# Patient Record
Sex: FEMALE | Race: OTHER | Employment: UNEMPLOYED | URBAN - METROPOLITAN AREA
[De-identification: names, ages, dates, MRNs, and addresses within clinical notes are randomized per-mention and may not be internally consistent; named-entity substitution may affect disease eponyms.]

---

## 2024-04-24 PROBLEM — E88.1 LIPODYSTROPHY: Status: ACTIVE | Noted: 2024-04-24

## 2024-04-24 RX ORDER — DIPHENOXYLATE HYDROCHLORIDE AND ATROPINE SULFATE 2.5; .025 MG/1; MG/1
1 TABLET ORAL DAILY
COMMUNITY

## 2024-04-24 RX ORDER — CETIRIZINE HYDROCHLORIDE 10 MG/1
10 TABLET ORAL DAILY
COMMUNITY

## 2024-04-24 NOTE — PRE-PROCEDURE INSTRUCTIONS
Pre-Surgery Instructions:   Medication Instructions    cetirizine (ZyrTEC) 10 mg tablet Take day of surgery. With sip of water     Cholecalciferol (VITAMIN D-3 PO) Stop taking 7 days prior to surgery.    multivitamin (THERAGRAN) TABS Stop taking 7 days prior to surgery.    Pt instructed on use of cleanser and showering instructions for DOS - both night before and DOS - pt verbalized understanding of instructions given.    Pt instructed no hair or body products on skin for DOS   Pt also instructed no razor blade use within one week prior to surgery lupillo at surgical site areas - ok to use electric razor up till 5/1 - no shaving 5/2 or 5/3.    Pt instructed that if any health status changes between now and DOS - notify surgeon office.    Medication instructions for day surgery reviewed. Please use only a sip of water to take your instructed medications. Avoid all over the counter vitamins, supplements and NSAIDS for one week prior to surgery per anesthesia guidelines. Tylenol is ok to take as needed.     You will receive a call one business day prior to surgery with an arrival time and hospital directions. If your surgery is scheduled on a Monday, the hospital will be calling you on the Friday prior to your surgery. If you have not heard from anyone by 8pm, please call the hospital supervisor through the hospital  at [phone]. ([place] [phone] or [place] [phone]).    Do not eat or drink anything after midnight the night before your surgery, including candy, mints, lifesavers, or chewing gum. Do not drink alcohol 24hrs before your surgery. Try not to smoke at least 24hrs before your surgery.       Follow the pre surgery showering instructions as listed in the “My Surgical Experience Booklet” or otherwise provided by your surgeon's office. Do not use a blade to shave the surgical area 1 week before surgery. It is okay to use a clean electric clippers up to 24 hours before surgery. Do not apply any  lotions, creams, including makeup, cologne, deodorant, or perfumes after showering on the day of your surgery. Do not use dry shampoo, hair spray, hair gel, or any type of hair products.     No contact lenses, eye make-up, or artificial eyelashes. Remove nail polish, including gel polish, and any artificial, gel, or acrylic nails if possible. Remove all jewelry including rings and body piercing jewelry.     Wear causal clothing that is easy to take on and off. Consider your type of surgery.    Keep any valuables, jewelry, piercings at home. Please bring any specially ordered equipment (sling, braces) if indicated.    Arrange for a responsible person to drive you to and from the hospital on the day of your surgery. Please confirm the visitor policy for the day of your procedure when you receive your phone call with an arrival time.     Call the surgeon's office with any new illnesses, exposures, or additional questions prior to surgery.    Please reference your “My Surgical Experience Booklet” for additional information to prepare for your upcoming surgery.

## 2024-05-03 ENCOUNTER — ANESTHESIA EVENT (OUTPATIENT)
Dept: PERIOP | Facility: HOSPITAL | Age: 49
End: 2024-05-03
Payer: SELF-PAY

## 2024-05-03 ENCOUNTER — ANESTHESIA (OUTPATIENT)
Dept: PERIOP | Facility: HOSPITAL | Age: 49
End: 2024-05-03
Payer: SELF-PAY

## 2024-05-03 ENCOUNTER — HOSPITAL ENCOUNTER (OUTPATIENT)
Facility: HOSPITAL | Age: 49
Setting detail: OUTPATIENT SURGERY
Discharge: HOME | End: 2024-05-03
Attending: SURGERY | Admitting: SURGERY
Payer: SELF-PAY

## 2024-05-03 VITALS
BODY MASS INDEX: 24.54 KG/M2 | RESPIRATION RATE: 18 BRPM | TEMPERATURE: 97.9 F | DIASTOLIC BLOOD PRESSURE: 74 MMHG | SYSTOLIC BLOOD PRESSURE: 124 MMHG | HEART RATE: 81 BPM | OXYGEN SATURATION: 94 % | HEIGHT: 60 IN | WEIGHT: 125 LBS

## 2024-05-03 LAB
EXT PREGNANCY TEST URINE: NEGATIVE
EXT. CONTROL: NORMAL

## 2024-05-03 PROCEDURE — 81025 URINE PREGNANCY TEST: CPT | Performed by: SURGERY

## 2024-05-03 RX ORDER — MAGNESIUM HYDROXIDE 1200 MG/15ML
LIQUID ORAL AS NEEDED
Status: DISCONTINUED | OUTPATIENT
Start: 2024-05-03 | End: 2024-05-03 | Stop reason: HOSPADM

## 2024-05-03 RX ORDER — ONDANSETRON 2 MG/ML
INJECTION INTRAMUSCULAR; INTRAVENOUS AS NEEDED
Status: DISCONTINUED | OUTPATIENT
Start: 2024-05-03 | End: 2024-05-03

## 2024-05-03 RX ORDER — FENTANYL CITRATE 50 UG/ML
INJECTION, SOLUTION INTRAMUSCULAR; INTRAVENOUS AS NEEDED
Status: DISCONTINUED | OUTPATIENT
Start: 2024-05-03 | End: 2024-05-03

## 2024-05-03 RX ORDER — HYDROCODONE BITARTRATE AND ACETAMINOPHEN 5; 325 MG/1; MG/1
1 TABLET ORAL EVERY 4 HOURS PRN
Status: DISCONTINUED | OUTPATIENT
Start: 2024-05-03 | End: 2024-05-03 | Stop reason: HOSPADM

## 2024-05-03 RX ORDER — CEFAZOLIN SODIUM 1 G/3ML
INJECTION, POWDER, FOR SOLUTION INTRAMUSCULAR; INTRAVENOUS AS NEEDED
Status: DISCONTINUED | OUTPATIENT
Start: 2024-05-03 | End: 2024-05-03

## 2024-05-03 RX ORDER — DEXAMETHASONE SODIUM PHOSPHATE 10 MG/ML
INJECTION, SOLUTION INTRAMUSCULAR; INTRAVENOUS AS NEEDED
Status: DISCONTINUED | OUTPATIENT
Start: 2024-05-03 | End: 2024-05-03

## 2024-05-03 RX ORDER — SODIUM CHLORIDE, SODIUM LACTATE, POTASSIUM CHLORIDE, CALCIUM CHLORIDE 600; 310; 30; 20 MG/100ML; MG/100ML; MG/100ML; MG/100ML
75 INJECTION, SOLUTION INTRAVENOUS CONTINUOUS
Status: DISCONTINUED | OUTPATIENT
Start: 2024-05-03 | End: 2024-05-03 | Stop reason: HOSPADM

## 2024-05-03 RX ORDER — HYDROCODONE BITARTRATE AND ACETAMINOPHEN 5; 325 MG/1; MG/1
2 TABLET ORAL EVERY 4 HOURS PRN
Status: DISCONTINUED | OUTPATIENT
Start: 2024-05-03 | End: 2024-05-03 | Stop reason: HOSPADM

## 2024-05-03 RX ORDER — MIDAZOLAM HYDROCHLORIDE 2 MG/2ML
INJECTION, SOLUTION INTRAMUSCULAR; INTRAVENOUS AS NEEDED
Status: DISCONTINUED | OUTPATIENT
Start: 2024-05-03 | End: 2024-05-03

## 2024-05-03 RX ORDER — MINERAL OIL
OIL (ML) MISCELLANEOUS AS NEEDED
Status: DISCONTINUED | OUTPATIENT
Start: 2024-05-03 | End: 2024-05-03 | Stop reason: HOSPADM

## 2024-05-03 RX ORDER — KETOROLAC TROMETHAMINE 30 MG/ML
30 INJECTION, SOLUTION INTRAMUSCULAR; INTRAVENOUS ONCE
Status: COMPLETED | OUTPATIENT
Start: 2024-05-03 | End: 2024-05-03

## 2024-05-03 RX ORDER — PROPOFOL 10 MG/ML
INJECTION, EMULSION INTRAVENOUS CONTINUOUS PRN
Status: DISCONTINUED | OUTPATIENT
Start: 2024-05-03 | End: 2024-05-03

## 2024-05-03 RX ORDER — ROCURONIUM BROMIDE 10 MG/ML
INJECTION, SOLUTION INTRAVENOUS AS NEEDED
Status: DISCONTINUED | OUTPATIENT
Start: 2024-05-03 | End: 2024-05-03

## 2024-05-03 RX ORDER — ACETAMINOPHEN 10 MG/ML
INJECTION, SOLUTION INTRAVENOUS AS NEEDED
Status: DISCONTINUED | OUTPATIENT
Start: 2024-05-03 | End: 2024-05-03

## 2024-05-03 RX ORDER — FENTANYL CITRATE/PF 50 MCG/ML
25 SYRINGE (ML) INJECTION
Status: DISCONTINUED | OUTPATIENT
Start: 2024-05-03 | End: 2024-05-03 | Stop reason: HOSPADM

## 2024-05-03 RX ORDER — ONDANSETRON 2 MG/ML
4 INJECTION INTRAMUSCULAR; INTRAVENOUS ONCE AS NEEDED
Status: DISCONTINUED | OUTPATIENT
Start: 2024-05-03 | End: 2024-05-03 | Stop reason: HOSPADM

## 2024-05-03 RX ORDER — HYDROMORPHONE HCL/PF 1 MG/ML
SYRINGE (ML) INJECTION AS NEEDED
Status: DISCONTINUED | OUTPATIENT
Start: 2024-05-03 | End: 2024-05-03

## 2024-05-03 RX ORDER — PROPOFOL 10 MG/ML
INJECTION, EMULSION INTRAVENOUS AS NEEDED
Status: DISCONTINUED | OUTPATIENT
Start: 2024-05-03 | End: 2024-05-03

## 2024-05-03 RX ORDER — LIDOCAINE HYDROCHLORIDE 10 MG/ML
INJECTION, SOLUTION EPIDURAL; INFILTRATION; INTRACAUDAL; PERINEURAL AS NEEDED
Status: DISCONTINUED | OUTPATIENT
Start: 2024-05-03 | End: 2024-05-03

## 2024-05-03 RX ORDER — CEFAZOLIN SODIUM 2 G/50ML
2000 SOLUTION INTRAVENOUS ONCE
Status: DISCONTINUED | OUTPATIENT
Start: 2024-05-03 | End: 2024-05-03 | Stop reason: HOSPADM

## 2024-05-03 RX ORDER — SODIUM CHLORIDE, SODIUM LACTATE, POTASSIUM CHLORIDE, CALCIUM CHLORIDE 600; 310; 30; 20 MG/100ML; MG/100ML; MG/100ML; MG/100ML
INJECTION, SOLUTION INTRAVENOUS CONTINUOUS PRN
Status: DISCONTINUED | OUTPATIENT
Start: 2024-05-03 | End: 2024-05-03

## 2024-05-03 RX ADMIN — ACETAMINOPHEN 1000 MG: 10 INJECTION INTRAVENOUS at 14:06

## 2024-05-03 RX ADMIN — SODIUM CHLORIDE, SODIUM LACTATE, POTASSIUM CHLORIDE, AND CALCIUM CHLORIDE: .6; .31; .03; .02 INJECTION, SOLUTION INTRAVENOUS at 12:25

## 2024-05-03 RX ADMIN — PROPOFOL 120 MG: 10 INJECTION, EMULSION INTRAVENOUS at 11:13

## 2024-05-03 RX ADMIN — ROCURONIUM BROMIDE 50 MG: 10 INJECTION INTRAVENOUS at 11:13

## 2024-05-03 RX ADMIN — ROCURONIUM BROMIDE 20 MG: 10 INJECTION INTRAVENOUS at 12:50

## 2024-05-03 RX ADMIN — ROCURONIUM BROMIDE 20 MG: 10 INJECTION INTRAVENOUS at 12:16

## 2024-05-03 RX ADMIN — DEXAMETHASONE SODIUM PHOSPHATE 10 MG: 10 INJECTION, SOLUTION INTRAMUSCULAR; INTRAVENOUS at 11:13

## 2024-05-03 RX ADMIN — ONDANSETRON 4 MG: 2 INJECTION INTRAMUSCULAR; INTRAVENOUS at 14:16

## 2024-05-03 RX ADMIN — MIDAZOLAM HYDROCHLORIDE 2 MG: 1 INJECTION, SOLUTION INTRAMUSCULAR; INTRAVENOUS at 11:07

## 2024-05-03 RX ADMIN — HYDROMORPHONE HYDROCHLORIDE 0.5 MG: 1 INJECTION, SOLUTION INTRAMUSCULAR; INTRAVENOUS; SUBCUTANEOUS at 12:43

## 2024-05-03 RX ADMIN — KETOROLAC TROMETHAMINE 30 MG: 30 INJECTION, SOLUTION INTRAMUSCULAR; INTRAVENOUS at 14:16

## 2024-05-03 RX ADMIN — SODIUM CHLORIDE, SODIUM LACTATE, POTASSIUM CHLORIDE, AND CALCIUM CHLORIDE: .6; .31; .03; .02 INJECTION, SOLUTION INTRAVENOUS at 13:24

## 2024-05-03 RX ADMIN — FENTANYL CITRATE 50 MCG: 50 INJECTION, SOLUTION INTRAMUSCULAR; INTRAVENOUS at 11:13

## 2024-05-03 RX ADMIN — LIDOCAINE HYDROCHLORIDE 50 MG: 10 INJECTION, SOLUTION EPIDURAL; INFILTRATION; INTRACAUDAL; PERINEURAL at 11:13

## 2024-05-03 RX ADMIN — SUGAMMADEX 120 MG: 100 INJECTION, SOLUTION INTRAVENOUS at 14:37

## 2024-05-03 RX ADMIN — SODIUM CHLORIDE, SODIUM LACTATE, POTASSIUM CHLORIDE, AND CALCIUM CHLORIDE: .6; .31; .03; .02 INJECTION, SOLUTION INTRAVENOUS at 11:05

## 2024-05-03 RX ADMIN — PROPOFOL 120 MCG/KG/MIN: 10 INJECTION, EMULSION INTRAVENOUS at 11:24

## 2024-05-03 RX ADMIN — CEFAZOLIN 1000 MG: 1 INJECTION, POWDER, FOR SOLUTION INTRAMUSCULAR; INTRAVENOUS; PARENTERAL at 11:23

## 2024-05-03 RX ADMIN — SODIUM CHLORIDE, SODIUM LACTATE, POTASSIUM CHLORIDE, AND CALCIUM CHLORIDE: .6; .31; .03; .02 INJECTION, SOLUTION INTRAVENOUS at 11:45

## 2024-05-03 RX ADMIN — FENTANYL CITRATE 50 MCG: 50 INJECTION, SOLUTION INTRAMUSCULAR; INTRAVENOUS at 11:41

## 2024-05-03 NOTE — NURSING NOTE
OOB and ambulated to bathroom, voided easily. Tolerating po well. Discharge instructions given and drain teaching completed

## 2024-05-03 NOTE — DISCHARGE INSTR - AVS FIRST PAGE
[facility]       [facility], [address] [place] [address], [address], [place] [address]   P [phone]/F [phone]/ [email or web address]        Home Instructions for the following procedures:   Abdominoplasty, Bodylift, Panniculectomy, Thigh lift       Please call the office today to schedule a post operative visit, and tell the office staff that your doctor needs to see you in our office in 7-10 days.       Use your pain medication as instructed. Pain medication may cause constipation.  Take something over the counter such as Senakot, or Colace TWICE daily while on pain medication. You want to make sure that you take the pain medication with food, to avoid getting an upset stomach. Do not drink alcohol while taking the pain medications.    Call your doctor if you notice any excessive swelling or bleeding.    Exercise calf muscles every hour. Extend your foot up and own at the ankle several times. Getting our of bed after surgery and walking is important to your health to prevent blood clots.    Elevate both the head and foot of the bed with two to three pillows. This will relieve tension on the suture line. Many patients choose to recover in a recliner for the first few days to help with putting strain on the abdominal area.    You may feel the need to walk in the bent over position. Try to stand up straight. It is not uncommon to develop low back pain when you walk in the bent-over position. This discomfort should subside once you are standing upright again.    Drink plenty of liquids. Your urine should appear clear. If your urine is yellow you need to drink more fluids    Do not smoke or use nicotine products for 2 months after your procedure. Any smoking or nicotine use may cause the skin of the abdomen to turn black and die off.    Do not lift anything that weights more then 10 pounds for about 4 weeks after the day of your surgery unless instructed by your  doctor.    You may remove dressings and shower the next day unless otherwise instructed by your doctor. IF you were given a binder/garment continue wearing your binder/garment at all times, except when showering or washing the garment. IF you are wearing a binder, sometimes patients are more comfortable wearing an old T-shirt underneath the binder. SOME patient may not have a binder, if this is the case, you will be placed into a garment when the drains are removed.    If you have drains, leaking around the drain site may occur. THIS IS NORMAL. You may apply a gauze sponge around the area to help with the drainage. The hospital staff will show you how to take care of the drains. Also, you will need to keep a record of the amount of drainage from each drain. Each drain will drain DIFFERENT amounts of fluid, THIS IS NORMAL.    It is not uncommon to have some serous fluid (yellowish, straw colored) drainage from your incisions or string-like particles in your drainage canisters.    You may have serous fluid drain from around the belly button. THIS IS NORMAL.  You may also have a small incision between your butt cheek for drainage due to liposucion. This drainage will stop within 24-48 hours. The opening will close on its own.    13. I recommend take 1-2 protein shakes per day with recovering to help with healing. If you were on any weight loss or appetite suppression medication prior to surgery, I recommend 2        protein shakes per day for 4 weeks after surgery.      Do not hesitate to call the office at [phone] if you have any questions about your surgery. The nursing staff will be glad to assist you in any possible way. If it is necessary for you to contract a doctor when the office is closed or on the weekend, please call [phone] and it will direct you to the answering service.   A physician will contact you to assist you with any problems or questions.

## 2024-05-03 NOTE — ANESTHESIA PREPROCEDURE EVALUATION
Procedure:  TUMMY TUCK (Abdomen)    Relevant Problems   Rheumatology   (+) Lipodystrophy        Physical Exam    Airway    Mallampati score: I  TM Distance: >3 FB  Neck ROM: full     Dental        Cardiovascular      Pulmonary      Other Findings  post-pubertal.      Anesthesia Plan  ASA Score- 1     Anesthesia Type- general with ASA Monitors.         Additional Monitors:     Airway Plan: ETT.           Plan Factors-Exercise tolerance (METS): >4 METS.    Chart reviewed.   Existing labs reviewed. Patient summary reviewed.                  Induction- intravenous.    Postoperative Plan- Plan for postoperative opioid use. Planned trial extubation    Informed Consent- Anesthetic plan and risks discussed with patient.  I personally reviewed this patient with the CRNA. Discussed and agreed on the Anesthesia Plan with the CRNA..

## 2024-05-03 NOTE — DISCHARGE SUMMARY
PLASTIC, RECONSTRUCTIVE, [facility] HAND SURGERY   Discharge Summary  Date of Admission:   5/3/2024  Date of Discharge:   05/03/24  Attending:  Julian Rose MD  Principal/Final Diagnosis:   Excess skin of abdomen [L98.7]  Principal Procedure:   TUMMY TUCK (Abdomen)  Discharge Medications:  See after visit summary for reconciled discharge medications provided to patient and family.  Reason for Admission:  Aditi Mitchell was electively admitted to undergo the above named procedure on 5/3/2024 as an outpatient.  Hospital Course:  Patient underwent the above named procedure on the day of admission without complications. They were discharged home the same day.  Disposition:  To home in care of self and family.  Condition:  Good  Follow up:  Patient with follow up in the office with Dr. Julian Rose MD in 1 week(s) or as scheduled per his office  Julian Rose MD  5/3/2024 10:51 AM

## 2024-05-03 NOTE — INTERVAL H&P NOTE
H&P reviewed. After examining the patient I find no changes in the patients condition since the H&P had been written.    Vitals:    05/03/24 0855   BP: 117/80   Pulse: 81   Resp: 18   Temp: (!) 96.9 °F (36.1 °C)   SpO2: 100%

## 2024-05-03 NOTE — OP NOTE
OPERATIVE REPORT  PATIENT NAME: Aditi Mitchell    :  1975  MRN: [id number]  Pt Location:  OR ROOM 11    SURGERY DATE: 5/3/2024    Surgeons and Role:     * Julian Rose MD - Primary     * Bethanie Edwards - Assisting    Preop Diagnosis:  Excess skin of abdomen [L98.7]    Post-Op Diagnosis Codes:     * Excess skin of abdomen [L98.7]    Procedure(s):  TUMMY TUCK    Specimen(s):  * No specimens in log *    Estimated Blood Loss:   Minimal    Drains:  Closed/Suction Drain Left;Lateral;Inferior Abdomen Bulb 15 Fr. (Active)   Number of days: 0       Closed/Suction Drain Right;Lateral;Inferior Abdomen Bulb 15 Fr. (Active)   Number of days: 0       Urethral Catheter Latex 16 Fr. (Active)   Number of days: 0       Anesthesia Type:   General    Operative Indications:  Excess skin of abdomen [L98.7]       Operative Findings:  none    Complications:   None    Procedure and Technique:  The patient was properly identified in the operating room, initially    intubated by anesthesia and placed in the prone position with adequate    padding support.      We went ahead and tumesced the flank area for 1.5 liter of our tumescent    solution and suctioned the flank for 2 liter of fat. The stab    incisions were closed with 4-0 PDS and a Dermabond dressing.    We then flipped the patient back into the supine position and    reprepped and redraped in the abdominal area.    We made an incision in the suprapubic line hip to hip and elevated the skin off   of the muscle layer dissecting all the way up to the xiphoid process. We then   went ahead and circumscribed the umbilicus, but left it attached to the   abdominal wall. We plicated the midline of the abdomen with both interrupted   and running 1 Prolene suture.    We injected 40 mL of 0.25% Marcaine with epinephrine into the abdominal    fascia. We pulled the excess skin inferiorly toward the feet.  We then went ahead and tumesced the anterior abdominal skin and lateral sides with  0.5 liters of our tumescent solution.  This was then suction with a #3 and 4 cannula for 0.5 liters of fat.  Once we had good contour , the excess skin was then pulled inferiorly toward the feet.  The    excess skin was then removed with a #15 blade and electrocautery. We    placed 2 15-Kiswahili Reji drain into the abdominal defect and pulled it    out through the lateral aspect of our incision line.  We placed progressive tension sutures anchoring the external fascia to Mir's with 2-0 PDS.  The drain was    held in place with 3-0 nylon suture. We then went ahead and closed our    deep fascia with 2-0 Vicryl, 2-0 PDS, deep dermis of 2-0 Vicryl, 3-0    PDS, a running subcuticular 3-0 Monocryl stitch and a Dermabond    dressing. A new opening was made through the abdominal wall skin where    the umbilicus was pulled through. The umbilicus was sutured in place    with 2-0 PDS and 4-0 chromic sutures.  Patient had an old umbilical ring at the inferior aspect of our abdominal midline incision.  This was excised off in a vertical fashion and closed with deep 2-0 PDS 3-0 PDS and 3-0 Monocryl suture.    The patient was then Dermabonded, awakened from surgery and taken to the recovery room in stable condition.      All counts were correct at the end of the procedure.    I was present for the entire procedure.    Patient Disposition:  PACU         SIGNATURE: Julian Rose MD  DATE: May 3, 2024  TIME: 2:38 PM

## 2024-05-03 NOTE — ANESTHESIA POSTPROCEDURE EVALUATION
Post-Op Assessment Note    CV Status:  Stable    Pain management: adequate       Mental Status:  Alert and awake   Hydration Status:  Euvolemic   PONV Controlled:  Controlled   Airway Patency:  Patent     Post Op Vitals Reviewed: Yes    No anethesia notable event occurred.    Staff: BINA               BP 97/54 (05/03/24 1446)    Temp 98.2 °F (36.8 °C) (05/03/24 1446)    Pulse 84 (05/03/24 1446)   Resp 16 (05/03/24 1446)    SpO2 99 % (05/03/24 1446)

## (undated) DEVICE — SCD SEQUENTIAL COMPRESSION COMFORT SLEEVE MEDIUM KNEE LENGTH: Brand: KENDALL SCD

## (undated) DEVICE — TRAY FOLEY 16FR URIMETER SURESTEP

## (undated) DEVICE — SUT PROLENE 1 CT-1 30 IN 8425H

## (undated) DEVICE — SPONGE LAP 18 X 18 IN STRL RFD

## (undated) DEVICE — POV-IOD SOLUTION 4OZ BT

## (undated) DEVICE — CRADLE EXTREMITY UNIVERSAL CONTOURED

## (undated) DEVICE — 1820 FOAM BLOCK NEEDLE COUNTER: Brand: DEVON

## (undated) DEVICE — STERILE POLYISOPRENE POWDER-FREE SURGICAL GLOVES: Brand: PROTEXIS

## (undated) DEVICE — BASIC SINGLE BASIN-LF: Brand: MEDLINE INDUSTRIES, INC.

## (undated) DEVICE — STANDARD SURGICAL GOWN, L: Brand: CONVERTORS

## (undated) DEVICE — JACKSON-PRATT 100CC BULB RESERVOIR: Brand: CARDINAL HEALTH

## (undated) DEVICE — LIGHT GLOVE GREEN

## (undated) DEVICE — PREMIUM DRY TRAY LF: Brand: MEDLINE INDUSTRIES, INC.

## (undated) DEVICE — PACK UNIVERSAL DRAPES SUB-Q ICD

## (undated) DEVICE — SUT CHROMIC 4-0 PS-2 18 IN 1637G

## (undated) DEVICE — NEPTUNE E-SEP SMOKE EVACUATION PENCIL, COATED, 70MM BLADE, PUSH BUTTON SWITCH: Brand: NEPTUNE E-SEP

## (undated) DEVICE — INTENDED FOR TISSUE SEPARATION, AND OTHER PROCEDURES THAT REQUIRE A SHARP SURGICAL BLADE TO PUNCTURE OR CUT.: Brand: BARD-PARKER ® SAFETYLOCK CARBON RIB-BACK BLADES

## (undated) DEVICE — CHLORAPREP HI-LITE 26ML ORANGE

## (undated) DEVICE — DRAIN HUBLESS 15FR 3 1/16IN

## (undated) DEVICE — SYRINGE 10ML LL

## (undated) DEVICE — CANNISTER WASTE IMPLOSION PROOF

## (undated) DEVICE — CHEST/BREAST DRAPE: Brand: CONVERTORS

## (undated) DEVICE — SYRINGE 30ML LL

## (undated) DEVICE — TUBING INFILTRATION 9FT

## (undated) DEVICE — UNDYED MONOFILAMENT (POLYDIOXANONE), ABSORBABLE SURGICAL SUTURE: Brand: PDS

## (undated) DEVICE — DISPOSABLE OR TOWEL: Brand: CARDINAL HEALTH

## (undated) DEVICE — SUT PDS II 2-0 CT-1 27 IN Z339H

## (undated) DEVICE — EXOFIN PRECISION PEN HIGH VISCOSITY TOPICAL SKIN ADHESIVE: Brand: EXOFIN PRECISION PEN, 1G

## (undated) DEVICE — PAD GROUNDING DUAL ADULT

## (undated) DEVICE — SUT VICRYL 2-0 CT-1 27 IN J259H

## (undated) DEVICE — NEEDLE 25G X 1 1/2

## (undated) DEVICE — NEEDLE BLUNT 18 G X 1 1/2IN

## (undated) DEVICE — SUT ETHILON 3-0 FS-1 18 IN 663G

## (undated) DEVICE — SUT PDS II 3-0 SH 27 IN Z316H

## (undated) DEVICE — TUBING LIPOSUCTION ASPIRATION 12FT STERILE

## (undated) DEVICE — SKIN MARKER DUAL TIP WITH RULER CAP, FLEXIBLE RULER AND LABELS: Brand: DEVON

## (undated) DEVICE — DRAPE TOWEL: Brand: CONVERTORS

## (undated) DEVICE — SURGICAL CLIPPER BLADE GENERAL USE

## (undated) DEVICE — SUT MONOCRYL 3-0 PS-2 27 IN Y427H